# Patient Record
Sex: FEMALE | Race: WHITE | NOT HISPANIC OR LATINO | URBAN - METROPOLITAN AREA
[De-identification: names, ages, dates, MRNs, and addresses within clinical notes are randomized per-mention and may not be internally consistent; named-entity substitution may affect disease eponyms.]

---

## 2025-03-11 ENCOUNTER — OFFICE VISIT (OUTPATIENT)
Dept: ORTHOPEDIC SURGERY | Facility: CLINIC | Age: 65
End: 2025-03-11
Payer: MEDICARE

## 2025-03-11 ENCOUNTER — HOSPITAL ENCOUNTER (OUTPATIENT)
Dept: RADIOLOGY | Facility: CLINIC | Age: 65
Discharge: HOME | End: 2025-03-11
Payer: MEDICARE

## 2025-03-11 DIAGNOSIS — M25.531 RIGHT WRIST PAIN: ICD-10-CM

## 2025-03-11 DIAGNOSIS — S63.91XA HAND SPRAIN, RIGHT, INITIAL ENCOUNTER: ICD-10-CM

## 2025-03-11 DIAGNOSIS — S60.221A CONTUSION OF DORSUM OF RIGHT HAND: ICD-10-CM

## 2025-03-11 DIAGNOSIS — M79.641 PAIN OF RIGHT HAND: ICD-10-CM

## 2025-03-11 PROCEDURE — 99203 OFFICE O/P NEW LOW 30 MIN: CPT | Performed by: ORTHOPAEDIC SURGERY

## 2025-03-11 PROCEDURE — L3908 WHO COCK-UP NONMOLDE PRE OTS: HCPCS | Performed by: ORTHOPAEDIC SURGERY

## 2025-03-11 PROCEDURE — 99214 OFFICE O/P EST MOD 30 MIN: CPT | Performed by: ORTHOPAEDIC SURGERY

## 2025-03-11 PROCEDURE — 73130 X-RAY EXAM OF HAND: CPT | Mod: RT

## 2025-03-11 PROCEDURE — 73110 X-RAY EXAM OF WRIST: CPT | Mod: RT

## 2025-03-11 NOTE — PROGRESS NOTES
History present illness: Patient presents today status post mechanical fall that occurred 2 days ago.  She describes ecchymosis and swelling.  She localizes pain over dorsal ulnar aspect of right hand over fourth and fifth metacarpal base.      Past medical history: The patient's past medical history, family history, social history, and review of systems were documented on the patient medical intake.  The updated data was reviewed in the electronic medical record.  History is negative except otherwise stated in history of present illness.        Physical examination:  General: Alert and oriented to person, place, and time.  No acute distress and breathing comfortably: Pleasant and cooperative with examination.  HEENT: Head is normocephalic and atraumatic.  Neck: Supple, no visible swelling.  Cardiovascular: No palpable tachycardia  Lungs: No audible wheezing or labored breathing  Abdomen: Nondistended.  Extremities: Evaluation of the right upper extremity finds the patient had palpable radial artery at the wrist with brisk capillary refill to all digits.  Patient has intact sensation to axillary radial median and ulnar nerves.  There are no open wounds.  There are no signs of infection.  There is no evidence of lymphedema or lymphatic streaking.  The patient has supple compartments to right arm forearm and hand.  Ecchymosis notable to dorsal and volar hand.  Most focally tender to fourth and fifth metacarpal base.  Gentle stressing shows no overt instability of fourth and fifth CMC articulation.      Radiology: No obvious acute fracture or dislocation      Assessment: Right hand contusion      Plan: Treatment options were discussed.  We talked about the possibility of occult fracture.  Recommendations were made for ice elevation splint immobilization gentle range of motion exercises to the digits nonweightbearing and follow-up with me in the office in 2 weeks for x-rays of right hand to include wrist.  Patient is  agreeable with this strategy.        Procedure:

## 2025-03-31 ENCOUNTER — HOSPITAL ENCOUNTER (OUTPATIENT)
Dept: RADIOLOGY | Facility: CLINIC | Age: 65
Discharge: HOME | End: 2025-03-31
Payer: MEDICARE

## 2025-03-31 ENCOUNTER — OFFICE VISIT (OUTPATIENT)
Dept: ORTHOPEDIC SURGERY | Facility: CLINIC | Age: 65
End: 2025-03-31
Payer: MEDICARE

## 2025-03-31 DIAGNOSIS — S60.221A CONTUSION OF DORSUM OF RIGHT HAND: ICD-10-CM

## 2025-03-31 PROCEDURE — 1036F TOBACCO NON-USER: CPT | Performed by: ORTHOPAEDIC SURGERY

## 2025-03-31 PROCEDURE — 26600 TREAT METACARPAL FRACTURE: CPT | Performed by: ORTHOPAEDIC SURGERY

## 2025-03-31 PROCEDURE — 99214 OFFICE O/P EST MOD 30 MIN: CPT | Performed by: ORTHOPAEDIC SURGERY

## 2025-03-31 PROCEDURE — 1159F MED LIST DOCD IN RCRD: CPT | Performed by: ORTHOPAEDIC SURGERY

## 2025-03-31 PROCEDURE — 73130 X-RAY EXAM OF HAND: CPT | Mod: RIGHT SIDE | Performed by: RADIOLOGY

## 2025-03-31 PROCEDURE — 99214 OFFICE O/P EST MOD 30 MIN: CPT | Mod: 25 | Performed by: ORTHOPAEDIC SURGERY

## 2025-03-31 PROCEDURE — 73130 X-RAY EXAM OF HAND: CPT | Mod: RT

## 2025-03-31 RX ORDER — AMLODIPINE BESYLATE 5 MG/1
5 TABLET ORAL DAILY
COMMUNITY

## 2025-03-31 RX ORDER — ATORVASTATIN CALCIUM 20 MG/1
20 TABLET, FILM COATED ORAL DAILY
COMMUNITY
Start: 2023-08-21

## 2025-03-31 RX ORDER — ACETAMINOPHEN 325 MG/1
650 TABLET ORAL EVERY 4 HOURS PRN
COMMUNITY
Start: 2025-02-13

## 2025-03-31 RX ORDER — ALBUTEROL SULFATE 90 UG/1
2 INHALANT RESPIRATORY (INHALATION) EVERY 6 HOURS PRN
COMMUNITY
Start: 2024-07-11

## 2025-03-31 RX ORDER — LEVOTHYROXINE SODIUM 88 UG/1
88 TABLET ORAL DAILY
COMMUNITY

## 2025-03-31 NOTE — PROGRESS NOTES
History present illness: Patient presents today for ongoing care of the right hand.  She was found to have contusion and there was concern for occult fracture about the fourth and fifth metacarpal base.  Patient has been noncompliant with activity restriction and bracing.  She presents today without her brace today.  She complains of persistent pain.      Past medical history: The patient's past medical history, family history, social history, and review of systems were documented on the patient medical intake.  The updated data was reviewed in the electronic medical record.  History is negative except otherwise stated in history of present illness.        Physical examination:  General: Alert and oriented to person, place, and time.  No acute distress and breathing comfortably: Pleasant and cooperative with examination.  HEENT: Head is normocephalic and atraumatic.  Neck: Supple, no visible swelling.  Cardiovascular: No palpable tachycardia  Lungs: No audible wheezing or labored breathing  Abdomen: Nondistended.  Extremities: Evaluation of the right upper extremity finds the patient had palpable radial artery at the wrist with brisk capillary refill to all digits.  Patient has intact sensation to axillary radial median and ulnar nerves.  There are no open wounds.  There are no signs of infection.  There is no evidence of lymphedema or lymphatic streaking.  The patient has supple compartments to right arm forearm and hand.  Tenderness palpation to right fourth and fifth metacarpal base.      Radiology: X-rays demonstrate nondisplaced healing fractures of right fourth and fifth metacarpal base, extra-articular.      Assessment: Right fourth and fifth metacarpal base fracture      Plan: Treatment options were discussed.  We explained the need for compliance.  She has her splint.  She is going to wear like a cast.  She will remain nonweightbearing.  3-week follow-up for x-rays of right hand out of splint.  Likely  completely healed at that time and will be able to return back to all activities without restriction.        Procedure:

## 2025-04-22 ENCOUNTER — APPOINTMENT (OUTPATIENT)
Dept: ORTHOPEDIC SURGERY | Facility: CLINIC | Age: 65
End: 2025-04-22
Payer: MEDICARE